# Patient Record
Sex: FEMALE | Race: WHITE | Employment: FULL TIME
[De-identification: names, ages, dates, MRNs, and addresses within clinical notes are randomized per-mention and may not be internally consistent; named-entity substitution may affect disease eponyms.]

---

## 2017-10-15 ENCOUNTER — HEALTH MAINTENANCE LETTER (OUTPATIENT)
Age: 32
End: 2017-10-15

## 2020-01-13 ENCOUNTER — OFFICE VISIT (OUTPATIENT)
Dept: URGENT CARE | Facility: URGENT CARE | Age: 35
End: 2020-01-13
Payer: COMMERCIAL

## 2020-01-13 VITALS
TEMPERATURE: 97.8 F | OXYGEN SATURATION: 97 % | DIASTOLIC BLOOD PRESSURE: 76 MMHG | HEART RATE: 87 BPM | SYSTOLIC BLOOD PRESSURE: 119 MMHG

## 2020-01-13 DIAGNOSIS — S06.0X0A CONCUSSION WITHOUT LOSS OF CONSCIOUSNESS, INITIAL ENCOUNTER: Primary | ICD-10-CM

## 2020-01-13 PROCEDURE — 99203 OFFICE O/P NEW LOW 30 MIN: CPT | Performed by: FAMILY MEDICINE

## 2020-01-14 NOTE — PROGRESS NOTES
SUBJECTIVE:  Trinity Solares is a 34 year old female who presents to the clinic today with a chief complaint of falling and hitting her head on ice on the back. No LOC. Headache now. Mild dizziness and exhuastion today.  No changes in appetite.  No denies any emesis, numbness or tingling, or other neurologic changes other than feeling more tired than usual.    No past medical history on file.    No current outpatient medications on file.       Social History     Tobacco Use     Smoking status: Never Smoker     Smokeless tobacco: Never Used   Substance Use Topics     Alcohol use: Not on file     ROS   ROS: 10 point ROS neg other than the symptoms noted above in the HPI.    OBJECTIVE:  /76   Pulse 87   Temp 97.8  F (36.6  C) (Tympanic)   SpO2 97%   GENERAL APPEARANCE: healthy, alert and no distress  EYES: EOMI,  PERRL, conjunctiva clear  HENT: Mild TTP on occipital area of head. NO ecchymosis present but with mild swelling present.  NECK: supple, nontender, no lymphadenopathy    ASSESSMENT: Most likely mild concussion due to patient feeling tired and in some mild dizziness.  Mild headache.     PLAN:  Counseled patient to return to normal level of activity gradually.  Also gave patient precautions to go to emergency room.  Benign exam.  We will hold off on imaging at this time.

## 2020-03-19 ENCOUNTER — VIRTUAL VISIT (OUTPATIENT)
Dept: FAMILY MEDICINE | Facility: OTHER | Age: 35
End: 2020-03-19

## 2020-03-19 NOTE — PROGRESS NOTES
"Date: 2020 16:49:38  Clinician: Lottie Cheung  Clinician NPI: [id number]  Patient: Trinity Solares  Patient : 1985  Patient Address: [address] [place] [address], [place] [address]  Patient Phone: [phone]  Visit Protocol: URI  Patient Summary:  Trinity is a 34 year old ( : 1985 ) female who initiated a Visit for COVID-19 (Coronavirus) evaluation and screening. When asked the question \"Please sign me up to receive news, health information and promotions from [employer].\", Trinity responded \"Yes\".    Trinity states her symptoms started 1-2 days ago.   Her symptoms consist of a cough, nasal congestion, malaise, a headache, and chills. She is experiencing mild difficulty breathing with activities but can speak normally in full sentences. Trinity also feels feverish but was unable to measure her temperature.   Symptom details     Nasal secretions: The color of her mucus is clear.    Cough: Trinity coughs every 5-10 minutes and her cough is not more bothersome at night. Phlegm comes into her throat when she coughs. She believes her cough is caused by post-nasal drip. The color of the phlegm is clear.     Headache: She states the headache is mild (1-3 on a 10 point pain scale).      Trinity denies having sore throat, ear pain, rhinitis, enlarged lymph nodes, facial pain or pressure, myalgias, teeth pain, and wheezing. She also denies having a sinus infection within the past year, having recent facial or sinus surgery in the past 60 days, and taking antibiotic medication for the symptoms.   Precipitating events  She has not recently been exposed to someone with influenza. Trinity has not been in close contact with any high risk individuals.   Pertinent COVID-19 (Coronavirus) information  Trinity has traveled internationally or to the areas where COVID-19 (Coronavirus) is widespread, including cruise ship travel in the last 14 days before the start of her symptoms. Countries or locations traveled as reported " by the patient (free text): [facility] - traveled airports in [place] &[place]   Trinity has not had a close contact with a laboratory-confirmed COVID-19 patient within 14 days of symptom onset. She also has not had a close contact with a suspected COVID-19 patient within 14 days of symptom onset.   Trinity is not a healthcare worker and does not work in a healthcare facility.   Pertinent medical history  Trinity does not get yeast infections when she takes antibiotics.   Trinity does not need a return to work/school note.   Weight: 145 lbs   Trinity does not smoke or use smokeless tobacco.   She denies pregnancy and denies breastfeeding. She is currently menstruating.   Weight: 145 lbs    MEDICATIONS: No current medications, ALLERGIES: NKDA  Clinician Response:  Dear Trinity,   Based on the information you have provided, you do have symptoms that are consistent with Coronavirus (COVID-19).  The coronavirus causes mild to severe respiratory illness with the most common symptoms including fever, cough and difficulty breathing. Unfortunately, many viruses cause similar symptoms and it can be difficult to distinguish between viruses, especially in mild cases, so we are presuming that anyone with cough or fever has coronavirus at this time.  Coronavirus/COVID-19 has reached the point of community spread in [place], meaning that we are finding the virus in people with no known exposure risk for della the virus. Given the increasing commonness of coronavirus in the community we are no longer testing patients who are not critically ill.  If you are a health care worker, you should refer to your employee health office for instructions about returning to work.  For everyone else who has cough or fever, you should assume you are infected with coronavirus. Accordingly, you should self-quarantine for seven days from the first day your symptoms started OR 72 hours after your cough and fever completely resolve -  WHICHEVER is LONGER. You should call if you find increasing shortness of breath, wheezing or sustained fever above 101.5. If you are significantly short of breath or experience chest pain you should call 911 or report to the nearest emergency department for urgent evaluation.    Isolate yourself at home.   Do Not allow any visitors  Do Not go to work or school  Do Not go to [facility],  centers, shopping, or other public places.  Do Not shake hands.  Avoid close contact with others (hugging, kissing).   Protect Others:    Cover Your Mouth and Nose with a mask, disposable tissue or wash cloth to avoid spreading germs to others.  Wash your hands and face frequently with soap and water.   If you develop significant shortness of breath that prevents you from doing normal activities, please call 911 or proceed to the nearest emergency room and alert them immediately that you have been in self-isolation for possible coronavirus.   For more information about COVID19 and options for caring for yourself at home, please visit the CDC website at https://www.cdc.gov/coronavirus/2019-ncov/about/steps-when-sick.htmlFor more options for care at [facility], please visit our website at [email or web address]     Diagnosis: Cough  Diagnosis ICD: R05  Additional Clinician Notes: I hope you're feeling better soon!